# Patient Record
Sex: FEMALE | Race: BLACK OR AFRICAN AMERICAN | Employment: FULL TIME | URBAN - METROPOLITAN AREA
[De-identification: names, ages, dates, MRNs, and addresses within clinical notes are randomized per-mention and may not be internally consistent; named-entity substitution may affect disease eponyms.]

---

## 2017-11-07 ENCOUNTER — OFFICE VISIT (OUTPATIENT)
Dept: GASTROENTEROLOGY | Facility: CLINIC | Age: 52
End: 2017-11-07

## 2017-11-07 ENCOUNTER — TELEPHONE (OUTPATIENT)
Dept: GASTROENTEROLOGY | Facility: CLINIC | Age: 52
End: 2017-11-07

## 2017-11-07 VITALS
WEIGHT: 235.19 LBS | SYSTOLIC BLOOD PRESSURE: 122 MMHG | BODY MASS INDEX: 42.2 KG/M2 | HEIGHT: 62.5 IN | DIASTOLIC BLOOD PRESSURE: 78 MMHG

## 2017-11-07 DIAGNOSIS — Z12.11 SCREENING FOR COLORECTAL CANCER: Primary | ICD-10-CM

## 2017-11-07 DIAGNOSIS — Z12.12 SCREENING FOR COLORECTAL CANCER: Primary | ICD-10-CM

## 2017-11-07 PROCEDURE — 99212 OFFICE O/P EST SF 10 MIN: CPT | Performed by: INTERNAL MEDICINE

## 2017-11-07 PROCEDURE — 99204 OFFICE O/P NEW MOD 45 MIN: CPT | Performed by: INTERNAL MEDICINE

## 2017-11-07 RX ORDER — ALBUTEROL SULFATE 90 UG/1
AEROSOL, METERED RESPIRATORY (INHALATION)
COMMUNITY

## 2017-11-07 RX ORDER — METOPROLOL SUCCINATE AND HYDROCHLOROTHIAZIDE 12.5; 5 MG/1; MG/1
TABLET ORAL
COMMUNITY

## 2017-11-07 NOTE — H&P
[address] [facility] - Gastroenterology                                                                                                  Clinic History and Physical MCG/ACT Inhalation Aero Soln ProAir HFA 90 mcg/actuation aerosol inhaler Disp:  Rfl:        Allergies:  No Known Allergies    ROS:   all 10 systems were reviewed and were negative except as noted in the HPI    PHYSICAL EXAM:   Blood pressure 122/78, height to the patient’s satisfaction. The patient elected to proceed with colonoscopy with intervention [i.e. polypectomy, control of bleeding, dilatation, etc.] as indicated.       Recommend:  -colonoscopy with Iv/conscious sedation with split suprep    Orders Th

## 2017-11-07 NOTE — PATIENT INSTRUCTIONS
1. Schedule colonoscopy with Iv/conscious sedation with Dr. Adler Board    2.  bowel prep from pharmacy (split suprep )    3. Continue all medications for procedure    4. Read all bowel prep instructions carefully    5.  AVOID seeds, nuts, popcorn, raw

## 2017-11-07 NOTE — TELEPHONE ENCOUNTER
Scheduled for:  Colonoscopy 38110  Provider Name: Dr. Guzmán Call  Date:  1/22/18  Location:  [facility]  Sedation:  IV sedation  Time:  8:45 am, arrival 7:45 am  Prep: Suprep  Meds/Allergies Reconciled?:  Physician reviewed  Diagnosis with codes:  Screening for colorecta

## 2018-01-18 ENCOUNTER — TELEPHONE (OUTPATIENT)
Dept: GASTROENTEROLOGY | Facility: CLINIC | Age: 53
End: 2018-01-18

## 2018-01-18 NOTE — TELEPHONE ENCOUNTER
Spoke w/ Natalie Higginbotham and states he received the prep instructions and has them ready for the pt.  I called the pt to notify that she to ask for Jim levine/c he has the instructions for her to review, she verbalized understanding and did not have further question

## 2018-01-18 NOTE — TELEPHONE ENCOUNTER
Pt contacted and she states she does not know where she put the prep instructions and would like them faxed to her pharmacy which is 2 blocks from her house. Copy of her prep instructions faxed to Aaron Correa in [place] at [phone].

## 2018-01-18 NOTE — TELEPHONE ENCOUNTER
Pt needs clarification on cln/prep instructions, cln arabella 1/22, pls call at:[phone],thanks.

## 2018-01-22 ENCOUNTER — HOSPITAL ENCOUNTER (OUTPATIENT)
Facility: HOSPITAL | Age: 53
Setting detail: HOSPITAL OUTPATIENT SURGERY
Discharge: HOME | End: 2018-01-22
Attending: INTERNAL MEDICINE | Admitting: INTERNAL MEDICINE
Payer: COMMERCIAL

## 2018-01-22 ENCOUNTER — SURGERY (OUTPATIENT)
Age: 53
End: 2018-01-22

## 2018-01-22 DIAGNOSIS — Z12.11 SCREENING FOR COLORECTAL CANCER: ICD-10-CM

## 2018-01-22 DIAGNOSIS — Z12.12 SCREENING FOR COLORECTAL CANCER: ICD-10-CM

## 2018-01-22 LAB — COLONOSCOPY STUDY: NORMAL

## 2018-01-22 PROCEDURE — 0DBN8ZX EXCISION OF SIGMOID COLON, VIA NATURAL OR ARTIFICIAL OPENING ENDOSCOPIC, DIAGNOSTIC: ICD-10-PCS | Performed by: INTERNAL MEDICINE

## 2018-01-22 PROCEDURE — 0DBL8ZX EXCISION OF TRANSVERSE COLON, VIA NATURAL OR ARTIFICIAL OPENING ENDOSCOPIC, DIAGNOSTIC: ICD-10-PCS | Performed by: INTERNAL MEDICINE

## 2018-01-22 PROCEDURE — 45380 COLONOSCOPY AND BIOPSY: CPT | Performed by: INTERNAL MEDICINE

## 2018-01-22 PROCEDURE — G0500 MOD SEDAT ENDO SERVICE >5YRS: HCPCS | Performed by: INTERNAL MEDICINE

## 2018-01-22 PROCEDURE — 99153 MOD SED SAME PHYS/QHP EA: CPT | Performed by: INTERNAL MEDICINE

## 2018-01-22 PROCEDURE — 45385 COLONOSCOPY W/LESION REMOVAL: CPT | Performed by: INTERNAL MEDICINE

## 2018-01-22 PROCEDURE — 0DBK8ZX EXCISION OF ASCENDING COLON, VIA NATURAL OR ARTIFICIAL OPENING ENDOSCOPIC, DIAGNOSTIC: ICD-10-PCS | Performed by: INTERNAL MEDICINE

## 2018-01-22 RX ORDER — SODIUM CHLORIDE 0.9 % (FLUSH) 0.9 %
10 SYRINGE (ML) INJECTION AS NEEDED
Status: DISCONTINUED | OUTPATIENT
Start: 2018-01-22 | End: 2018-01-22

## 2018-01-22 RX ORDER — SODIUM CHLORIDE, SODIUM LACTATE, POTASSIUM CHLORIDE, CALCIUM CHLORIDE 600; 310; 30; 20 MG/100ML; MG/100ML; MG/100ML; MG/100ML
INJECTION, SOLUTION INTRAVENOUS CONTINUOUS
Status: DISCONTINUED | OUTPATIENT
Start: 2018-01-22 | End: 2018-01-22

## 2018-01-22 RX ORDER — MIDAZOLAM HYDROCHLORIDE 1 MG/ML
INJECTION INTRAMUSCULAR; INTRAVENOUS
Status: DISCONTINUED | OUTPATIENT
Start: 2018-01-22 | End: 2018-01-22

## 2018-01-22 RX ORDER — MIDAZOLAM HYDROCHLORIDE 1 MG/ML
1 INJECTION INTRAMUSCULAR; INTRAVENOUS EVERY 5 MIN PRN
Status: DISCONTINUED | OUTPATIENT
Start: 2018-01-22 | End: 2018-01-22

## 2018-01-22 NOTE — H&P
History & Physical Examination    Patient Name: Deon Knight  MRN: [id number]  [facility]: [id number]  YOB: 1965    Diagnosis: colorectal cancer screening      Prescriptions Prior to Admission:  Metoprolol-HCTZ ER 50-12.5 MG Oral Tablet 24 Hr T Gastroenterology  1/22/2018  8:40 AM

## 2018-01-22 NOTE — OPERATIVE REPORT
Colonoscopy Report    Madai Duff     1965 Age 50year old   PCP No primary care provider on file.  Endoscopist Yamilet Marcum MD     Date of procedure: 18    Procedure: Colonoscopy w/ biopsy and snare polypectomy    Pre-operative di withdrawn and the endoscope was removed. The patient tolerated the procedure well. There were no immediate postoperative complications. The patient’s vital signs were monitored throughout the procedure and remained stable.     Estimated blood loss: insignif Gastroenterology  1/22/2018  9:15 AM

## 2018-01-23 ENCOUNTER — TELEPHONE (OUTPATIENT)
Dept: GASTROENTEROLOGY | Facility: CLINIC | Age: 53
End: 2018-01-23

## 2018-01-23 VITALS
RESPIRATION RATE: 18 BRPM | WEIGHT: 237 LBS | HEART RATE: 100 BPM | DIASTOLIC BLOOD PRESSURE: 80 MMHG | HEIGHT: 62 IN | SYSTOLIC BLOOD PRESSURE: 116 MMHG | BODY MASS INDEX: 43.61 KG/M2 | OXYGEN SATURATION: 98 %

## 2018-01-23 NOTE — TELEPHONE ENCOUNTER
I mailed out colonoscopy results letter to pt  Updated health maintenance  Entered into 5 yr recall  Recall colon in 5 years per.  Colon done 1/22/18  GI staff: please place recall for colonoscopy in 5 years

## 2018-03-14 ENCOUNTER — APPOINTMENT (OUTPATIENT)
Dept: OTHER | Facility: HOSPITAL | Age: 53
End: 2018-03-14
Attending: EMERGENCY MEDICINE

## 2019-09-28 ENCOUNTER — APPOINTMENT (OUTPATIENT)
Dept: CT IMAGING | Facility: HOSPITAL | Age: 54
End: 2019-09-28
Attending: STUDENT IN AN ORGANIZED HEALTH CARE EDUCATION/TRAINING PROGRAM
Payer: COMMERCIAL

## 2019-09-28 ENCOUNTER — HOSPITAL ENCOUNTER (EMERGENCY)
Facility: HOSPITAL | Age: 54
Discharge: HOME | End: 2019-09-29
Attending: STUDENT IN AN ORGANIZED HEALTH CARE EDUCATION/TRAINING PROGRAM
Payer: COMMERCIAL

## 2019-09-28 DIAGNOSIS — R09.81 NASAL CONGESTION: ICD-10-CM

## 2019-09-28 DIAGNOSIS — R10.11 RUQ ABDOMINAL PAIN: Primary | ICD-10-CM

## 2019-09-28 DIAGNOSIS — H92.03 DISCOMFORT OF BOTH EARS: ICD-10-CM

## 2019-09-28 DIAGNOSIS — J06.9 UPPER RESPIRATORY TRACT INFECTION, UNSPECIFIED TYPE: ICD-10-CM

## 2019-09-28 PROCEDURE — 99285 EMERGENCY DEPT VISIT HI MDM: CPT

## 2019-09-28 PROCEDURE — 99284 EMERGENCY DEPT VISIT MOD MDM: CPT

## 2019-09-28 PROCEDURE — 80053 COMPREHEN METABOLIC PANEL: CPT | Performed by: STUDENT IN AN ORGANIZED HEALTH CARE EDUCATION/TRAINING PROGRAM

## 2019-09-28 PROCEDURE — 83690 ASSAY OF LIPASE: CPT | Performed by: STUDENT IN AN ORGANIZED HEALTH CARE EDUCATION/TRAINING PROGRAM

## 2019-09-28 PROCEDURE — 96361 HYDRATE IV INFUSION ADD-ON: CPT

## 2019-09-28 PROCEDURE — 85025 COMPLETE CBC W/AUTO DIFF WBC: CPT | Performed by: STUDENT IN AN ORGANIZED HEALTH CARE EDUCATION/TRAINING PROGRAM

## 2019-09-28 PROCEDURE — 96374 THER/PROPH/DIAG INJ IV PUSH: CPT

## 2019-09-28 RX ORDER — IBUPROFEN 600 MG/1
600 TABLET ORAL ONCE
Status: DISCONTINUED | OUTPATIENT
Start: 2019-09-28 | End: 2019-09-28

## 2019-09-28 RX ORDER — KETOROLAC TROMETHAMINE 30 MG/ML
15 INJECTION, SOLUTION INTRAMUSCULAR; INTRAVENOUS ONCE
Status: COMPLETED | OUTPATIENT
Start: 2019-09-28 | End: 2019-09-28

## 2019-09-29 ENCOUNTER — APPOINTMENT (OUTPATIENT)
Dept: CT IMAGING | Facility: HOSPITAL | Age: 54
End: 2019-09-29
Attending: STUDENT IN AN ORGANIZED HEALTH CARE EDUCATION/TRAINING PROGRAM
Payer: COMMERCIAL

## 2019-09-29 VITALS
RESPIRATION RATE: 16 BRPM | TEMPERATURE: 98 F | HEART RATE: 81 BPM | SYSTOLIC BLOOD PRESSURE: 143 MMHG | WEIGHT: 219 LBS | HEIGHT: 62 IN | BODY MASS INDEX: 40.3 KG/M2 | DIASTOLIC BLOOD PRESSURE: 86 MMHG | OXYGEN SATURATION: 97 %

## 2019-09-29 PROCEDURE — 74177 CT ABD & PELVIS W/CONTRAST: CPT | Performed by: STUDENT IN AN ORGANIZED HEALTH CARE EDUCATION/TRAINING PROGRAM

## 2019-09-29 RX ORDER — FAMOTIDINE 20 MG/1
20 TABLET ORAL 2 TIMES DAILY PRN
Qty: 30 TABLET | Refills: 0 | Status: SHIPPED | OUTPATIENT
Start: 2019-09-29 | End: 2019-10-29

## 2019-09-29 RX ORDER — DICYCLOMINE HCL 20 MG
20 TABLET ORAL 4 TIMES DAILY PRN
Qty: 30 TABLET | Refills: 0 | Status: SHIPPED | OUTPATIENT
Start: 2019-09-29 | End: 2019-09-29

## 2019-09-29 RX ORDER — PSEUDOEPHEDRINE HCL 120 MG/1
120 TABLET, FILM COATED, EXTENDED RELEASE ORAL EVERY 12 HOURS PRN
Qty: 10 TABLET | Refills: 0 | Status: SHIPPED | OUTPATIENT
Start: 2019-09-29 | End: 2019-09-29

## 2019-09-29 RX ORDER — PSEUDOEPHEDRINE HCL 120 MG/1
120 TABLET, FILM COATED, EXTENDED RELEASE ORAL EVERY 12 HOURS PRN
Qty: 10 TABLET | Refills: 0 | Status: SHIPPED | OUTPATIENT
Start: 2019-09-29 | End: 2019-10-29

## 2019-09-29 RX ORDER — FAMOTIDINE 20 MG/1
20 TABLET ORAL 2 TIMES DAILY PRN
Qty: 30 TABLET | Refills: 0 | Status: SHIPPED | OUTPATIENT
Start: 2019-09-29 | End: 2019-09-29

## 2019-09-29 RX ORDER — DICYCLOMINE HCL 20 MG
20 TABLET ORAL 4 TIMES DAILY PRN
Qty: 30 TABLET | Refills: 0 | Status: SHIPPED | OUTPATIENT
Start: 2019-09-29 | End: 2019-10-29

## 2019-09-29 NOTE — ED PROVIDER NOTES
Patient Seen in: [facility] Emergency Department      History   Patient presents with:  Cough/URI    Stated Complaint: congestion    HPI    Patient is a 61-year-old female who presents to the emergency department reporting right upper quadrant abdom HPI.  Constitutional and vital signs reviewed. All other systems reviewed and negative except as noted above.     Physical Exam     ED Triage Vitals [09/28/19 2149]   /83   Pulse 81   Resp 17   Temp 98 °F (36.7 °C)   Temp src Oral   SpO2 98 %   O Notable for the following components:    RBC URINE 6-10 (*)     Squamous Epi.  Cells Moderate (*)     Mucous Urine 1+ (*)     All other components within normal limits   CBC W/ DIFFERENTIAL - Abnormal; Notable for the following components:    RDW-SD 47[address] [facility] [id number]  [phone]    In 2 days      Sherri Leyva MD  [address] Natalie Walton  [id number]    Schedule an appointment as soon as possible for a visit          Medications Prescribed:  Discharge Medica

## 2019-09-29 NOTE — ED INITIAL ASSESSMENT (HPI)
Pt ambulatory to er cc RUQ pain interm since GB removed at [place] [address] 21 yrs ago. Last treated at [facility] 2yrs ago with no dx received.  Also nasal congestion and ear issues

## 2023-04-03 RX ORDER — ALBUTEROL SULFATE 90 UG/1
1 AEROSOL, METERED RESPIRATORY (INHALATION)
COMMUNITY

## 2023-04-03 RX ORDER — METOPROLOL SUCCINATE 50 MG/1
50 TABLET, EXTENDED RELEASE ORAL DAILY
COMMUNITY

## 2023-04-04 ENCOUNTER — OFFICE VISIT (OUTPATIENT)
Dept: OBGYN | Age: 58
End: 2023-04-04

## 2023-04-04 VITALS
HEART RATE: 100 BPM | BODY MASS INDEX: 39.28 KG/M2 | SYSTOLIC BLOOD PRESSURE: 124 MMHG | HEIGHT: 63 IN | DIASTOLIC BLOOD PRESSURE: 70 MMHG | OXYGEN SATURATION: 95 % | WEIGHT: 221.7 LBS | TEMPERATURE: 98.1 F

## 2023-04-04 DIAGNOSIS — Z01.419 GYNECOLOGIC EXAM NORMAL: Primary | ICD-10-CM

## 2023-04-04 DIAGNOSIS — N64.4 BREAST PAIN, LEFT: ICD-10-CM

## 2023-04-04 DIAGNOSIS — N39.46 MIXED STRESS AND URGE URINARY INCONTINENCE: ICD-10-CM

## 2023-04-04 DIAGNOSIS — Z12.4 ENCOUNTER FOR SCREENING FOR MALIGNANT NEOPLASM OF CERVIX: ICD-10-CM

## 2023-04-04 DIAGNOSIS — N95.1 VAGINAL DRYNESS, MENOPAUSAL: ICD-10-CM

## 2023-04-04 PROBLEM — I10 ESSENTIAL HYPERTENSION: Status: ACTIVE | Noted: 2023-04-04

## 2023-04-04 PROBLEM — E11.9 DIABETES MELLITUS (CMD): Status: ACTIVE | Noted: 2021-01-01

## 2023-04-04 PROBLEM — F17.200 SMOKER: Status: ACTIVE | Noted: 2023-04-04

## 2023-04-04 PROBLEM — E04.2 MULTINODULAR GOITER (NONTOXIC): Status: ACTIVE | Noted: 2023-04-04

## 2023-04-04 PROBLEM — E66.9 OBESITY: Status: ACTIVE | Noted: 2023-04-04

## 2023-04-04 PROBLEM — J45.909 ASTHMA: Status: ACTIVE | Noted: 2023-04-04

## 2023-04-04 PROCEDURE — 3078F DIAST BP <80 MM HG: CPT | Performed by: OBSTETRICS & GYNECOLOGY

## 2023-04-04 PROCEDURE — 87624 HPV HI-RISK TYP POOLED RSLT: CPT | Performed by: INTERNAL MEDICINE

## 2023-04-04 PROCEDURE — 3074F SYST BP LT 130 MM HG: CPT | Performed by: OBSTETRICS & GYNECOLOGY

## 2023-04-04 PROCEDURE — 99386 PREV VISIT NEW AGE 40-64: CPT | Performed by: OBSTETRICS & GYNECOLOGY

## 2023-04-04 PROCEDURE — 88175 CYTOPATH C/V AUTO FLUID REDO: CPT | Performed by: INTERNAL MEDICINE

## 2023-04-04 RX ORDER — GLIPIZIDE 10 MG/1
TABLET ORAL
COMMUNITY
Start: 2023-03-30

## 2023-04-04 RX ORDER — INSULIN GLARGINE 100 [IU]/ML
INJECTION, SOLUTION SUBCUTANEOUS
COMMUNITY
Start: 2023-02-10

## 2023-04-04 RX ORDER — FLASH GLUCOSE SENSOR
KIT MISCELLANEOUS
COMMUNITY
Start: 2023-03-06

## 2023-04-04 RX ORDER — SEMAGLUTIDE 1.34 MG/ML
INJECTION, SOLUTION SUBCUTANEOUS
COMMUNITY
Start: 2023-03-15

## 2023-04-04 RX ORDER — IBUPROFEN 800 MG/1
TABLET ORAL
COMMUNITY
Start: 2023-03-15

## 2023-04-04 RX ORDER — AMOXICILLIN 500 MG/1
TABLET, FILM COATED ORAL
COMMUNITY
Start: 2023-04-01 | End: 2023-04-04 | Stop reason: ALTCHOICE

## 2023-04-06 ENCOUNTER — TELEPHONE (OUTPATIENT)
Dept: OBGYN | Age: 58
End: 2023-04-06

## 2023-04-10 LAB
CASE RPRT: NORMAL
CLINICAL INFO: NORMAL
CYTOLOGY CVX/VAG STUDY: NORMAL
HPV16+18+45 E6+E7MRNA CVX NAA+PROBE: NEGATIVE
Lab: NORMAL
PAP EDUCATIONAL NOTE: NORMAL
SPECIMEN ADEQUACY: NORMAL

## 2023-04-18 ENCOUNTER — HOSPITAL ENCOUNTER (OUTPATIENT)
Dept: CT IMAGING | Age: 58
Discharge: HOME | End: 2023-04-18
Attending: OBSTETRICS & GYNECOLOGY

## 2023-04-18 DIAGNOSIS — R92.8 ABNORMAL MAMMOGRAM: ICD-10-CM

## 2023-04-18 DIAGNOSIS — N64.4 BREAST PAIN, LEFT: ICD-10-CM

## 2023-04-18 PROCEDURE — G0279 TOMOSYNTHESIS, MAMMO: HCPCS

## 2023-04-18 PROCEDURE — 76642 ULTRASOUND BREAST LIMITED: CPT

## 2023-04-24 DIAGNOSIS — R92.8 ABNORMAL MAMMOGRAM: Primary | ICD-10-CM

## 2023-08-13 ENCOUNTER — TELEPHONE (OUTPATIENT)
Dept: OBGYN | Age: 58
End: 2023-08-13

## 2023-08-13 DIAGNOSIS — N39.46 MIXED STRESS AND URGE URINARY INCONTINENCE: Primary | ICD-10-CM

## 2024-02-13 ENCOUNTER — TELEPHONE (OUTPATIENT)
Dept: OBGYN | Age: 59
End: 2024-02-13

## 2024-02-15 ENCOUNTER — ADMINISTRATIVE DOCUMENTATION (OUTPATIENT)
Dept: CT IMAGING | Age: 59
End: 2024-02-15

## 2024-03-18 ENCOUNTER — HOSPITAL ENCOUNTER (OUTPATIENT)
Dept: CT IMAGING | Age: 59
Discharge: HOME | End: 2024-03-18
Attending: OBSTETRICS & GYNECOLOGY

## 2024-03-18 DIAGNOSIS — R92.8 ABNORMAL MAMMOGRAM: ICD-10-CM

## 2024-03-18 DIAGNOSIS — R92.8 ABNORMAL MAMMOGRAM: Primary | ICD-10-CM

## 2024-03-18 PROCEDURE — 76642 ULTRASOUND BREAST LIMITED: CPT

## 2024-03-18 PROCEDURE — 77066 DX MAMMO INCL CAD BI: CPT

## 2024-03-29 ENCOUNTER — E-ADVICE (OUTPATIENT)
Dept: OTHER | Age: 59
End: 2024-03-29

## 2024-04-08 ENCOUNTER — APPOINTMENT (OUTPATIENT)
Dept: OBGYN | Age: 59
End: 2024-04-08

## 2024-11-13 ENCOUNTER — CLINICAL ABSTRACT (OUTPATIENT)
Dept: FAMILY MEDICINE | Age: 59
End: 2024-11-13

## 2025-01-16 ENCOUNTER — TELEPHONE (OUTPATIENT)
Dept: OBGYN | Age: 60
End: 2025-01-16

## 2025-01-23 ENCOUNTER — APPOINTMENT (OUTPATIENT)
Dept: CT IMAGING | Age: 60
End: 2025-01-23
Attending: OBSTETRICS & GYNECOLOGY

## 2025-01-23 ENCOUNTER — HOSPITAL ENCOUNTER (OUTPATIENT)
Dept: CT IMAGING | Age: 60
Discharge: HOME | End: 2025-01-23
Attending: OBSTETRICS & GYNECOLOGY

## 2025-01-23 DIAGNOSIS — R92.8 ABNORMAL MAMMOGRAM: ICD-10-CM

## 2025-01-23 PROCEDURE — 77066 DX MAMMO INCL CAD BI: CPT

## 2025-02-27 ENCOUNTER — APPOINTMENT (OUTPATIENT)
Dept: OBGYN | Age: 60
End: 2025-02-27

## 2025-02-27 VITALS
HEIGHT: 63 IN | SYSTOLIC BLOOD PRESSURE: 121 MMHG | WEIGHT: 237.32 LBS | OXYGEN SATURATION: 100 % | HEART RATE: 105 BPM | TEMPERATURE: 97.3 F | BODY MASS INDEX: 42.05 KG/M2 | DIASTOLIC BLOOD PRESSURE: 85 MMHG

## 2025-02-27 DIAGNOSIS — N39.46 MIXED STRESS AND URGE URINARY INCONTINENCE: ICD-10-CM

## 2025-02-27 DIAGNOSIS — Z12.31 SCREENING MAMMOGRAM FOR BREAST CANCER: ICD-10-CM

## 2025-02-27 DIAGNOSIS — Z01.419 ENCOUNTER FOR GYNECOLOGICAL EXAMINATION (GENERAL) (ROUTINE) WITHOUT ABNORMAL FINDINGS: Primary | ICD-10-CM

## 2025-02-27 DIAGNOSIS — Z12.4 ENCOUNTER FOR SCREENING FOR MALIGNANT NEOPLASM OF CERVIX: ICD-10-CM

## 2025-02-27 DIAGNOSIS — F17.200 SMOKER: ICD-10-CM

## 2025-02-27 DIAGNOSIS — R92.8 ABNORMAL MAMMOGRAM: ICD-10-CM

## 2025-02-27 DIAGNOSIS — Z86.0100 HISTORY OF COLONIC POLYPS: ICD-10-CM

## 2025-02-27 LAB
APPEARANCE UR: ABNORMAL
BACTERIA #/AREA URNS HPF: ABNORMAL /HPF
BILIRUB UR QL STRIP: NEGATIVE
COLOR UR: YELLOW
GLUCOSE UR STRIP-MCNC: NEGATIVE MG/DL
HGB UR QL STRIP: ABNORMAL
HYALINE CASTS #/AREA URNS LPF: ABNORMAL /LPF
KETONES UR STRIP-MCNC: NEGATIVE MG/DL
LEUKOCYTE ESTERASE UR QL STRIP: ABNORMAL
MUCOUS THREADS URNS QL MICRO: PRESENT
NITRITE UR QL STRIP: POSITIVE
PH UR STRIP: 5.5 [PH] (ref 5–7)
PROT UR STRIP-MCNC: ABNORMAL MG/DL
RBC #/AREA URNS HPF: ABNORMAL /HPF
SP GR UR STRIP: 1.02 (ref 1–1.03)
SQUAMOUS #/AREA URNS HPF: ABNORMAL /HPF
UROBILINOGEN UR STRIP-MCNC: 0.2 MG/DL
WBC #/AREA URNS HPF: ABNORMAL /HPF
YEAST URNS QL MICRO: PRESENT

## 2025-02-27 PROCEDURE — 81001 URINALYSIS AUTO W/SCOPE: CPT | Performed by: CLINICAL MEDICAL LABORATORY

## 2025-02-27 RX ORDER — VARENICLINE TARTRATE 0.5 (11)-1
KIT ORAL
Qty: 42 EACH | Refills: 1 | Status: SHIPPED | OUTPATIENT
Start: 2025-02-27 | End: 2025-04-17

## 2025-02-27 ASSESSMENT — PATIENT HEALTH QUESTIONNAIRE - PHQ9
2. FEELING DOWN, DEPRESSED OR HOPELESS: NOT AT ALL
SUM OF ALL RESPONSES TO PHQ9 QUESTIONS 1 AND 2: 0
SUM OF ALL RESPONSES TO PHQ9 QUESTIONS 1 AND 2: 0
CLINICAL INTERPRETATION OF PHQ2 SCORE: NO FURTHER SCREENING NEEDED
SUM OF ALL RESPONSES TO PHQ9 QUESTIONS 1 AND 2: 0
CLINICAL INTERPRETATION OF PHQ2 SCORE: NO FURTHER SCREENING NEEDED
1. LITTLE INTEREST OR PLEASURE IN DOING THINGS: NOT AT ALL
SUM OF ALL RESPONSES TO PHQ9 QUESTIONS 1 AND 2: 0
1. LITTLE INTEREST OR PLEASURE IN DOING THINGS: NOT AT ALL
2. FEELING DOWN, DEPRESSED OR HOPELESS: NOT AT ALL

## 2025-02-28 ENCOUNTER — TELEPHONE (OUTPATIENT)
Dept: OBGYN | Age: 60
End: 2025-02-28

## 2025-02-28 DIAGNOSIS — R32 URINARY INCONTINENCE, UNSPECIFIED TYPE: Primary | ICD-10-CM

## 2025-03-10 ENCOUNTER — LAB SERVICES (OUTPATIENT)
Dept: LAB | Age: 60
End: 2025-03-10

## 2025-03-10 DIAGNOSIS — R32 URINARY INCONTINENCE, UNSPECIFIED TYPE: ICD-10-CM

## 2025-03-10 PROCEDURE — 87186 SC STD MICRODIL/AGAR DIL: CPT | Performed by: CLINICAL MEDICAL LABORATORY

## 2025-03-10 PROCEDURE — 87077 CULTURE AEROBIC IDENTIFY: CPT | Performed by: CLINICAL MEDICAL LABORATORY

## 2025-03-10 PROCEDURE — 87086 URINE CULTURE/COLONY COUNT: CPT | Performed by: CLINICAL MEDICAL LABORATORY

## 2025-03-12 ENCOUNTER — TELEPHONE (OUTPATIENT)
Dept: OBGYN | Age: 60
End: 2025-03-12

## 2025-03-12 LAB — BACTERIA UR CULT: ABNORMAL

## 2025-03-12 RX ORDER — AMOXICILLIN AND CLAVULANATE POTASSIUM 250; 125 MG/1; MG/1
1 TABLET, FILM COATED ORAL 2 TIMES DAILY
Qty: 14 TABLET | Refills: 0 | Status: SHIPPED | OUTPATIENT
Start: 2025-03-12 | End: 2025-03-19

## 2025-04-01 ENCOUNTER — APPOINTMENT (OUTPATIENT)
Dept: OBGYN | Age: 60
End: 2025-04-01

## 2025-04-01 VITALS
OXYGEN SATURATION: 98 % | SYSTOLIC BLOOD PRESSURE: 131 MMHG | TEMPERATURE: 98 F | HEART RATE: 87 BPM | DIASTOLIC BLOOD PRESSURE: 80 MMHG

## 2025-04-01 DIAGNOSIS — Z86.0100 HISTORY OF COLONIC POLYPS: ICD-10-CM

## 2025-04-01 DIAGNOSIS — N39.46 MIXED STRESS AND URGE URINARY INCONTINENCE: Primary | ICD-10-CM

## 2025-04-01 DIAGNOSIS — Z87.440 HISTORY OF UTI: ICD-10-CM

## 2025-04-01 DIAGNOSIS — F17.200 SMOKER: ICD-10-CM

## 2025-04-01 RX ORDER — TIRZEPATIDE 2.5 MG/.5ML
INJECTION, SOLUTION SUBCUTANEOUS
COMMUNITY
Start: 2025-03-31

## 2025-04-04 LAB — BACTERIA UR CULT: ABNORMAL

## 2025-04-04 RX ORDER — SULFAMETHOXAZOLE AND TRIMETHOPRIM 800; 160 MG/1; MG/1
1 TABLET ORAL 2 TIMES DAILY
Qty: 14 TABLET | Refills: 0 | Status: SHIPPED | OUTPATIENT
Start: 2025-04-04 | End: 2025-04-11

## 2025-04-23 ENCOUNTER — TELEPHONE (OUTPATIENT)
Dept: OBGYN | Age: 60
End: 2025-04-23

## 2025-04-23 DIAGNOSIS — N31.8 FREQUENCY-URGENCY SYNDROME: Primary | ICD-10-CM

## 2025-04-23 DIAGNOSIS — Z87.440 HISTORY OF UTI: ICD-10-CM

## 2025-05-20 RX ORDER — VARENICLINE TARTRATE 0.5 (11)-1
KIT ORAL
Qty: 53 EACH | Refills: 1 | Status: SHIPPED | OUTPATIENT
Start: 2025-05-20

## 2025-07-16 RX ORDER — VARENICLINE TARTRATE 0.5 (11)-1
KIT ORAL
Qty: 53 EACH | Refills: 1 | Status: SHIPPED | OUTPATIENT
Start: 2025-07-16

## 2025-07-28 RX ORDER — SULFAMETHOXAZOLE AND TRIMETHOPRIM 800; 160 MG/1; MG/1
TABLET ORAL
Qty: 14 TABLET | Refills: 0 | OUTPATIENT
Start: 2025-07-28

## (undated) DEVICE — ENDOSCOPY PACK - LOWER: Brand: MEDLINE INDUSTRIES, INC.

## (undated) DEVICE — Device: Brand: DEFENDO AIR/WATER/SUCTION AND BIOPSY VALVE

## (undated) DEVICE — FORCEP RADIAL JAW 4

## (undated) DEVICE — SNARE CAPTIFLEX MICRO-OVL OLY

## (undated) NOTE — LETTER
[facility], [address] [place] [address], [address]  [phone]                01/23/18      [address] [place] [address]        Dear Andi Lomeli,    I reviewed the pathology report

## (undated) NOTE — ED AVS SNAPSHOT
Elaine Vanessa   MRN: [id number]    Department:  [facility] Emergency Department   Date of Visit:  9/28/2019           Disclosure     Insurance plans vary and the physician(s) referred by the ER may not be covered by your plan.  Please contact y tell this physician (or your personal doctor if your instructions are to return to your personal doctor) about any new or lasting problems. The primary care or specialist physician will see patients referred from the [facility] Emergency Department.  Lydia Ennis